# Patient Record
Sex: FEMALE | Race: WHITE | Employment: FULL TIME | URBAN - METROPOLITAN AREA
[De-identification: names, ages, dates, MRNs, and addresses within clinical notes are randomized per-mention and may not be internally consistent; named-entity substitution may affect disease eponyms.]

---

## 2018-11-18 ENCOUNTER — APPOINTMENT (OUTPATIENT)
Dept: CT IMAGING | Facility: CLINIC | Age: 38
DRG: 896 | End: 2018-11-18
Attending: EMERGENCY MEDICINE
Payer: COMMERCIAL

## 2018-11-18 ENCOUNTER — HOSPITAL ENCOUNTER (INPATIENT)
Facility: CLINIC | Age: 38
LOS: 2 days | Discharge: HOME | DRG: 896 | End: 2018-11-20
Attending: EMERGENCY MEDICINE | Admitting: INTERNAL MEDICINE
Payer: COMMERCIAL

## 2018-11-18 DIAGNOSIS — F10.920 ALCOHOL INTOXICATION, UNCOMPLICATED (H): ICD-10-CM

## 2018-11-18 DIAGNOSIS — T42.4X4A BENZODIAZEPINE OVERDOSE OF UNDETERMINED INTENT, INITIAL ENCOUNTER: ICD-10-CM

## 2018-11-18 DIAGNOSIS — F41.9 ANXIETY: ICD-10-CM

## 2018-11-18 DIAGNOSIS — G92.9 TOXIC ENCEPHALOPATHY: Primary | ICD-10-CM

## 2018-11-18 PROBLEM — T50.901A OVERDOSE: Status: ACTIVE | Noted: 2018-11-18

## 2018-11-18 LAB
ALBUMIN SERPL-MCNC: 4 G/DL (ref 3.4–5)
ALBUMIN UR-MCNC: NEGATIVE MG/DL
ALP SERPL-CCNC: 72 U/L (ref 40–150)
ALT SERPL W P-5'-P-CCNC: 15 U/L (ref 0–50)
AMPHETAMINES UR QL SCN: NEGATIVE
ANION GAP SERPL CALCULATED.3IONS-SCNC: 5 MMOL/L (ref 3–14)
APAP SERPL-MCNC: <2 MG/L (ref 10–20)
APPEARANCE UR: CLEAR
AST SERPL W P-5'-P-CCNC: 12 U/L (ref 0–45)
BARBITURATES UR QL: NEGATIVE
BASOPHILS # BLD AUTO: 0 10E9/L (ref 0–0.2)
BASOPHILS NFR BLD AUTO: 0.3 %
BENZODIAZ UR QL: POSITIVE
BILIRUB SERPL-MCNC: 0.4 MG/DL (ref 0.2–1.3)
BILIRUB UR QL STRIP: NEGATIVE
BUN SERPL-MCNC: 6 MG/DL (ref 7–30)
CALCIUM SERPL-MCNC: 8.2 MG/DL (ref 8.5–10.1)
CANNABINOIDS UR QL SCN: NEGATIVE
CHLORIDE SERPL-SCNC: 115 MMOL/L (ref 94–109)
CO2 BLDCOV-SCNC: 24 MMOL/L (ref 21–28)
CO2 SERPL-SCNC: 24 MMOL/L (ref 20–32)
COCAINE UR QL: NEGATIVE
COLOR UR AUTO: NORMAL
CREAT SERPL-MCNC: 0.61 MG/DL (ref 0.52–1.04)
DIFFERENTIAL METHOD BLD: NORMAL
EOSINOPHIL # BLD AUTO: 0.1 10E9/L (ref 0–0.7)
EOSINOPHIL NFR BLD AUTO: 0.5 %
ERYTHROCYTE [DISTWIDTH] IN BLOOD BY AUTOMATED COUNT: 13.9 % (ref 10–15)
ETHANOL SERPL-MCNC: 0.2 G/DL
GFR SERPL CREATININE-BSD FRML MDRD: >90 ML/MIN/1.7M2
GLUCOSE BLDC GLUCOMTR-MCNC: 71 MG/DL (ref 70–99)
GLUCOSE BLDC GLUCOMTR-MCNC: 89 MG/DL (ref 70–99)
GLUCOSE SERPL-MCNC: 89 MG/DL (ref 70–99)
GLUCOSE UR STRIP-MCNC: NEGATIVE MG/DL
HCG SERPL QL: NEGATIVE
HCT VFR BLD AUTO: 38.2 % (ref 35–47)
HGB BLD-MCNC: 12.7 G/DL (ref 11.7–15.7)
HGB UR QL STRIP: NEGATIVE
IMM GRANULOCYTES # BLD: 0 10E9/L (ref 0–0.4)
IMM GRANULOCYTES NFR BLD: 0.1 %
KETONES UR STRIP-MCNC: NEGATIVE MG/DL
LACTATE BLD-SCNC: 1.9 MMOL/L (ref 0.7–2.1)
LACTATE BLD-SCNC: 2 MMOL/L (ref 0.7–2)
LEUKOCYTE ESTERASE UR QL STRIP: NEGATIVE
LYMPHOCYTES # BLD AUTO: 2.8 10E9/L (ref 0.8–5.3)
LYMPHOCYTES NFR BLD AUTO: 30.5 %
MAGNESIUM SERPL-MCNC: 2.4 MG/DL (ref 1.6–2.3)
MCH RBC QN AUTO: 28.8 PG (ref 26.5–33)
MCHC RBC AUTO-ENTMCNC: 33.2 G/DL (ref 31.5–36.5)
MCV RBC AUTO: 87 FL (ref 78–100)
MONOCYTES # BLD AUTO: 0.5 10E9/L (ref 0–1.3)
MONOCYTES NFR BLD AUTO: 5 %
NEUTROPHILS # BLD AUTO: 5.8 10E9/L (ref 1.6–8.3)
NEUTROPHILS NFR BLD AUTO: 63.6 %
NITRATE UR QL: NEGATIVE
OPIATES UR QL SCN: NEGATIVE
PCO2 BLDV: 41 MM HG (ref 40–50)
PCP UR QL SCN: NEGATIVE
PH BLDV: 7.38 PH (ref 7.32–7.43)
PH UR STRIP: 5 PH (ref 5–7)
PLATELET # BLD AUTO: 296 10E9/L (ref 150–450)
PO2 BLDV: 47 MM HG (ref 25–47)
POTASSIUM SERPL-SCNC: 3.4 MMOL/L (ref 3.4–5.3)
PROT SERPL-MCNC: 7.7 G/DL (ref 6.8–8.8)
RBC # BLD AUTO: 4.41 10E12/L (ref 3.8–5.2)
SALICYLATES SERPL-MCNC: <2 MG/DL
SAO2 % BLDV FROM PO2: 81 %
SODIUM SERPL-SCNC: 144 MMOL/L (ref 133–144)
SOURCE: NORMAL
SP GR UR STRIP: 1.01 (ref 1–1.03)
TSH SERPL DL<=0.005 MIU/L-ACNC: 1.49 MU/L (ref 0.4–4)
UROBILINOGEN UR STRIP-MCNC: NORMAL MG/DL (ref 0–2)
WBC # BLD AUTO: 9.2 10E9/L (ref 4–11)

## 2018-11-18 PROCEDURE — 83605 ASSAY OF LACTIC ACID: CPT

## 2018-11-18 PROCEDURE — 25000128 H RX IP 250 OP 636: Performed by: INTERNAL MEDICINE

## 2018-11-18 PROCEDURE — 80329 ANALGESICS NON-OPIOID 1 OR 2: CPT | Performed by: EMERGENCY MEDICINE

## 2018-11-18 PROCEDURE — 83735 ASSAY OF MAGNESIUM: CPT | Performed by: EMERGENCY MEDICINE

## 2018-11-18 PROCEDURE — 40000965 ZZH STATISTIC END TITIAL CO2 MONITORING

## 2018-11-18 PROCEDURE — 00000146 ZZHCL STATISTIC GLUCOSE BY METER IP

## 2018-11-18 PROCEDURE — 84443 ASSAY THYROID STIM HORMONE: CPT | Performed by: EMERGENCY MEDICINE

## 2018-11-18 PROCEDURE — 99291 CRITICAL CARE FIRST HOUR: CPT | Mod: 25

## 2018-11-18 PROCEDURE — 99292 CRITICAL CARE ADDL 30 MIN: CPT

## 2018-11-18 PROCEDURE — 93005 ELECTROCARDIOGRAM TRACING: CPT

## 2018-11-18 PROCEDURE — 82803 BLOOD GASES ANY COMBINATION: CPT

## 2018-11-18 PROCEDURE — 80307 DRUG TEST PRSMV CHEM ANLYZR: CPT | Performed by: EMERGENCY MEDICINE

## 2018-11-18 PROCEDURE — 96360 HYDRATION IV INFUSION INIT: CPT

## 2018-11-18 PROCEDURE — 96361 HYDRATE IV INFUSION ADD-ON: CPT

## 2018-11-18 PROCEDURE — 40000275 ZZH STATISTIC RCP TIME EA 10 MIN

## 2018-11-18 PROCEDURE — 99223 1ST HOSP IP/OBS HIGH 75: CPT | Mod: AI | Performed by: INTERNAL MEDICINE

## 2018-11-18 PROCEDURE — 85025 COMPLETE CBC W/AUTO DIFF WBC: CPT | Performed by: EMERGENCY MEDICINE

## 2018-11-18 PROCEDURE — 84703 CHORIONIC GONADOTROPIN ASSAY: CPT | Performed by: EMERGENCY MEDICINE

## 2018-11-18 PROCEDURE — 25000128 H RX IP 250 OP 636: Performed by: EMERGENCY MEDICINE

## 2018-11-18 PROCEDURE — 83605 ASSAY OF LACTIC ACID: CPT | Performed by: EMERGENCY MEDICINE

## 2018-11-18 PROCEDURE — 80320 DRUG SCREEN QUANTALCOHOLS: CPT | Performed by: EMERGENCY MEDICINE

## 2018-11-18 PROCEDURE — 80053 COMPREHEN METABOLIC PANEL: CPT | Performed by: EMERGENCY MEDICINE

## 2018-11-18 PROCEDURE — 70450 CT HEAD/BRAIN W/O DYE: CPT

## 2018-11-18 PROCEDURE — 81003 URINALYSIS AUTO W/O SCOPE: CPT | Performed by: EMERGENCY MEDICINE

## 2018-11-18 PROCEDURE — 20000003 ZZH R&B ICU

## 2018-11-18 RX ORDER — POTASSIUM CL/LIDO/0.9 % NACL 10MEQ/0.1L
10 INTRAVENOUS SOLUTION, PIGGYBACK (ML) INTRAVENOUS
Status: DISCONTINUED | OUTPATIENT
Start: 2018-11-18 | End: 2018-11-20 | Stop reason: HOSPADM

## 2018-11-18 RX ORDER — ONDANSETRON 2 MG/ML
4 INJECTION INTRAMUSCULAR; INTRAVENOUS EVERY 6 HOURS PRN
Status: DISCONTINUED | OUTPATIENT
Start: 2018-11-18 | End: 2018-11-20 | Stop reason: HOSPADM

## 2018-11-18 RX ORDER — AMOXICILLIN 250 MG
2 CAPSULE ORAL 2 TIMES DAILY PRN
Status: DISCONTINUED | OUTPATIENT
Start: 2018-11-18 | End: 2018-11-20 | Stop reason: HOSPADM

## 2018-11-18 RX ORDER — PROCHLORPERAZINE MALEATE 5 MG
10 TABLET ORAL EVERY 6 HOURS PRN
Status: DISCONTINUED | OUTPATIENT
Start: 2018-11-18 | End: 2018-11-19

## 2018-11-18 RX ORDER — BISACODYL 10 MG
10 SUPPOSITORY, RECTAL RECTAL DAILY PRN
Status: DISCONTINUED | OUTPATIENT
Start: 2018-11-18 | End: 2018-11-20 | Stop reason: HOSPADM

## 2018-11-18 RX ORDER — NALOXONE HYDROCHLORIDE 0.4 MG/ML
.1-.4 INJECTION, SOLUTION INTRAMUSCULAR; INTRAVENOUS; SUBCUTANEOUS
Status: DISCONTINUED | OUTPATIENT
Start: 2018-11-18 | End: 2018-11-20 | Stop reason: HOSPADM

## 2018-11-18 RX ORDER — POTASSIUM CHLORIDE 7.45 MG/ML
10 INJECTION INTRAVENOUS
Status: DISCONTINUED | OUTPATIENT
Start: 2018-11-18 | End: 2018-11-20 | Stop reason: HOSPADM

## 2018-11-18 RX ORDER — PROCHLORPERAZINE 25 MG
25 SUPPOSITORY, RECTAL RECTAL EVERY 12 HOURS PRN
Status: DISCONTINUED | OUTPATIENT
Start: 2018-11-18 | End: 2018-11-19

## 2018-11-18 RX ORDER — POTASSIUM CHLORIDE 1500 MG/1
20-40 TABLET, EXTENDED RELEASE ORAL
Status: DISCONTINUED | OUTPATIENT
Start: 2018-11-18 | End: 2018-11-20 | Stop reason: HOSPADM

## 2018-11-18 RX ORDER — ONDANSETRON 4 MG/1
4 TABLET, ORALLY DISINTEGRATING ORAL EVERY 6 HOURS PRN
Status: DISCONTINUED | OUTPATIENT
Start: 2018-11-18 | End: 2018-11-20 | Stop reason: HOSPADM

## 2018-11-18 RX ORDER — POTASSIUM CHLORIDE 1.5 G/1.58G
20-40 POWDER, FOR SOLUTION ORAL
Status: DISCONTINUED | OUTPATIENT
Start: 2018-11-18 | End: 2018-11-20 | Stop reason: HOSPADM

## 2018-11-18 RX ORDER — POTASSIUM CHLORIDE 29.8 MG/ML
20 INJECTION INTRAVENOUS
Status: DISCONTINUED | OUTPATIENT
Start: 2018-11-18 | End: 2018-11-20 | Stop reason: HOSPADM

## 2018-11-18 RX ORDER — SODIUM CHLORIDE 9 MG/ML
INJECTION, SOLUTION INTRAVENOUS CONTINUOUS
Status: DISCONTINUED | OUTPATIENT
Start: 2018-11-18 | End: 2018-11-20 | Stop reason: HOSPADM

## 2018-11-18 RX ORDER — ACETAMINOPHEN 325 MG/1
650 TABLET ORAL EVERY 4 HOURS PRN
Status: DISCONTINUED | OUTPATIENT
Start: 2018-11-18 | End: 2018-11-20 | Stop reason: HOSPADM

## 2018-11-18 RX ORDER — AMOXICILLIN 250 MG
1 CAPSULE ORAL 2 TIMES DAILY PRN
Status: DISCONTINUED | OUTPATIENT
Start: 2018-11-18 | End: 2018-11-20 | Stop reason: HOSPADM

## 2018-11-18 RX ORDER — ACETAMINOPHEN 650 MG/1
650 SUPPOSITORY RECTAL EVERY 4 HOURS PRN
Status: DISCONTINUED | OUTPATIENT
Start: 2018-11-18 | End: 2018-11-20 | Stop reason: HOSPADM

## 2018-11-18 RX ORDER — POLYETHYLENE GLYCOL 3350 17 G/17G
17 POWDER, FOR SOLUTION ORAL DAILY PRN
Status: DISCONTINUED | OUTPATIENT
Start: 2018-11-18 | End: 2018-11-20 | Stop reason: HOSPADM

## 2018-11-18 RX ADMIN — SODIUM CHLORIDE, POTASSIUM CHLORIDE, SODIUM LACTATE AND CALCIUM CHLORIDE 1000 ML: 600; 310; 30; 20 INJECTION, SOLUTION INTRAVENOUS at 20:01

## 2018-11-18 RX ADMIN — SODIUM CHLORIDE: 9 INJECTION, SOLUTION INTRAVENOUS at 22:04

## 2018-11-18 RX ADMIN — SODIUM CHLORIDE, POTASSIUM CHLORIDE, SODIUM LACTATE AND CALCIUM CHLORIDE 1000 ML: 600; 310; 30; 20 INJECTION, SOLUTION INTRAVENOUS at 18:23

## 2018-11-18 ASSESSMENT — ACTIVITIES OF DAILY LIVING (ADL)
COGNITION: 0 - NO COGNITION ISSUES REPORTED
TRANSFERRING: 0-->INDEPENDENT
TOILETING: 0-->INDEPENDENT
AMBULATION: 0-->INDEPENDENT
FALL_HISTORY_WITHIN_LAST_SIX_MONTHS: NO
RETIRED_COMMUNICATION: 0-->UNDERSTANDS/COMMUNICATES WITHOUT DIFFICULTY
RETIRED_EATING: 0-->INDEPENDENT
SWALLOWING: 0-->SWALLOWS FOODS/LIQUIDS WITHOUT DIFFICULTY
DRESS: 0-->INDEPENDENT
BATHING: 0-->INDEPENDENT

## 2018-11-18 NOTE — IP AVS SNAPSHOT
[facility] [address] [facility]    [address] [place] [address]    Phone:  [phone]                                       After Visit Summary   11/18/2018    Ana Blunt    MRN: [id number]           After Visit Summary Signature Page     I have received my discharge instructions, and my questions have been answered. I have discussed any challenges I see with this plan with the nurse or doctor.    ..........................................................................................................................................  Patient/Patient Representative Signature      ..........................................................................................................................................  Patient Representative Print Name and Relationship to Patient    ..................................................               ................................................  Date                                   Time    ..........................................................................................................................................  Reviewed by Signature/Title    ...................................................              ..............................................  Date                                               Time          22EPIC Rev 08/18

## 2018-11-18 NOTE — IP AVS SNAPSHOT
MRN:[id number]                      After Visit Summary   11/18/2018    Ana Blunt    MRN: [id number]           Thank you!     Thank you for choosing [facility] for your care. Our goal is always to provide you with excellent care. Hearing back from our patients is one way we can continue to improve our services. Please take a few minutes to complete the written survey that you may receive in the mail after you visit with us. Thank you!        Patient Information     Date Of Birth          1980        Designated Caregiver       Most Recent Value    Caregiver    Will someone help with your care after discharge? no      About your hospital stay     You were admitted on:  November 18, 2018 You last received care in the:  [facility] [address] [facility]    You were discharged on:  November 20, 2018        Reason for your hospital stay       Alcohol intoxication, overdose, anxiety                  Who to Call     For medical emergencies, please call 911.  For non-urgent questions about your medical care, please call your primary care provider or clinic, None          Attending Provider     Provider Specialty    Ann Colon MD Emergency Medicine    Russ Newell MD Emergency Medicine    Enrique Medina MD Internal Medicine       Primary Care Provider Fax #    Physician No Ref-Primary [phone]      After Care Instructions     Activity       Your activity upon discharge: activity as tolerated            Diet       Follow this diet upon discharge: Regular, avoid alcohol                  Follow-up Appointments     Follow-up and recommended labs and tests        1. Patient should follow-up with Dr. Phoenix at [facility] ([phone]) on December 3rd at 2:45pm                        Pending Results     No orders found from 11/16/2018 to 11/19/2018.            Statement of Approval     Ordered          11/20/18 1049  I have reviewed and agree with all the  "recommendations and orders detailed in this document.  EFFECTIVE NOW     Approved and electronically signed by:  Shoaib Montano DO             Admission Information     Date & Time Provider Department Dept. Phone    2018 Enrique Medina MD [facility] [address] [facility] [phone]      Your Vitals Were     Blood Pressure Pulse Temperature Respirations Height Weight    121/86 (BP Location: Left arm) 76 97.6  F (36.4  C) (Oral) 18 1.676 m (5' 6\") 67.2 kg (148 lb 2.4 oz)    Last Period Pulse Oximetry BMI (Body Mass Index)             (LMP Unknown) 94% 23.91 kg/m2         [employer] Information     [facility] lets you send messages to your doctor, view your test results, renew your prescriptions, schedule appointments and more. To sign up, go to [email or web address] . Click on \"Log in\" on the left side of the screen, which will take you to the Welcome page. Then click on \"Sign up Now\" on the right side of the page.     You will be asked to enter the access code listed below, as well as some personal information. Please follow the directions to create your username and password.     Your access code is: [email or web address]  Expires: 2019  9:22 AM     Your access code will  in 90 days. If you need help or a new code, please call your [facility] clinic or [phone].        Care EveryWhere ID     This is your Care EveryWhere ID. This could be used by other organizations to access your [facility] medical records  [id number]        Equal Access to Services     [facility]: Hadabimael mcdonald Somonique, waaxda luqadaha, qaybta kaalmabaldemar meier idiin hayaan adeeg kharash la'aan . So [facility] [phone].    ATENCIÓN: Si habla español, tiene a horn disposición servicios gratuitos de asistencia lingüística. Llame al [phone].    We comply with applicable federal civil rights laws and [place] laws. We do not discriminate on the basis of race, color, national origin, age, disability, sex, " sexual orientation, or gender identity.               Review of your medicines      START taking        Dose / Directions    QUEtiapine 25 MG tablet   Commonly known as:  SEROquel   Used for:  Anxiety        Dose:  12.5-25 mg   Take 0.5-1 tablets (12.5-25 mg) by mouth 2 times daily as needed (anxiety)   Quantity:  30 tablet   Refills:  0       vortioxetine 5 MG tablet   Commonly known as:  TRINTELLIX/BRINTELLIX   Used for:  Anxiety        Dose:  5 mg   Start taking on:  11/21/2018   Take 1 tablet (5 mg) by mouth daily   Quantity:  30 tablet   Refills:  0         CONTINUE these medicines which have NOT CHANGED        Dose / Directions    ferrous sulfate 325 (65 Fe) MG tablet   Commonly known as:  IRON        Dose:  325 mg   Take 325 mg by mouth daily   Refills:  0       magnesium oxide 400 MG Caps        Dose:  1 capsule   Take 1 capsule by mouth daily   Refills:  0       multivitamin, therapeutic Tabs tablet        Dose:  1 tablet   Take 1 tablet by mouth daily   Refills:  0         STOP taking     ALPRAZolam 0.5 MG tablet   Commonly known as:  XANAX           citalopram 20 MG tablet   Commonly known as:  celeXA                Where to get your medicines      These medications were sent to [facility] [address] [place] [address] [address] [address]    Hours:  24-hours Phone:  [phone]     QUEtiapine 25 MG tablet    vortioxetine 5 MG tablet                Protect others around you: Learn how to safely use, store and throw away your medicines at www.disposemymeds.org.             Medication List: This is a list of all your medications and when to take them. Check marks below indicate your daily home schedule. Keep this list as a reference.      Medications           Morning Afternoon Evening Bedtime As Needed    ferrous sulfate 325 (65 Fe) MG tablet   Commonly known as:  IRON   Take 325 mg by mouth daily                                   magnesium  oxide 400 MG Caps   Take 1 capsule by mouth daily                                   multivitamin, therapeutic Tabs tablet   Take 1 tablet by mouth daily                                   QUEtiapine 25 MG tablet   Commonly known as:  SEROquel   Take 0.5-1 tablets (12.5-25 mg) by mouth 2 times daily as needed (anxiety)                                   vortioxetine 5 MG tablet   Commonly known as:  TRINTELLIX/BRINTELLIX   Take 1 tablet (5 mg) by mouth daily   Start taking on:  11/21/2018   Last time this was given:  5 mg on 11/20/2018  8:57 AM

## 2018-11-19 LAB
ALBUMIN SERPL-MCNC: 3.3 G/DL (ref 3.4–5)
ALP SERPL-CCNC: 60 U/L (ref 40–150)
ALT SERPL W P-5'-P-CCNC: 14 U/L (ref 0–50)
ANION GAP SERPL CALCULATED.3IONS-SCNC: 3 MMOL/L (ref 3–14)
AST SERPL W P-5'-P-CCNC: 20 U/L (ref 0–45)
BILIRUB DIRECT SERPL-MCNC: <0.1 MG/DL (ref 0–0.2)
BILIRUB SERPL-MCNC: 0.5 MG/DL (ref 0.2–1.3)
BUN SERPL-MCNC: 5 MG/DL (ref 7–30)
CALCIUM SERPL-MCNC: 7.7 MG/DL (ref 8.5–10.1)
CHLORIDE SERPL-SCNC: 116 MMOL/L (ref 94–109)
CO2 SERPL-SCNC: 24 MMOL/L (ref 20–32)
CREAT SERPL-MCNC: 0.52 MG/DL (ref 0.52–1.04)
ERYTHROCYTE [DISTWIDTH] IN BLOOD BY AUTOMATED COUNT: 14.1 % (ref 10–15)
GFR SERPL CREATININE-BSD FRML MDRD: >90 ML/MIN/1.7M2
GLUCOSE BLDC GLUCOMTR-MCNC: 149 MG/DL (ref 70–99)
GLUCOSE BLDC GLUCOMTR-MCNC: 76 MG/DL (ref 70–99)
GLUCOSE BLDC GLUCOMTR-MCNC: 80 MG/DL (ref 70–99)
GLUCOSE BLDC GLUCOMTR-MCNC: 84 MG/DL (ref 70–99)
GLUCOSE SERPL-MCNC: 79 MG/DL (ref 70–99)
HCT VFR BLD AUTO: 34.1 % (ref 35–47)
HGB BLD-MCNC: 11.3 G/DL (ref 11.7–15.7)
INTERPRETATION ECG - MUSE: NORMAL
MAGNESIUM SERPL-MCNC: 2 MG/DL (ref 1.6–2.3)
MCH RBC QN AUTO: 28.8 PG (ref 26.5–33)
MCHC RBC AUTO-ENTMCNC: 33.1 G/DL (ref 31.5–36.5)
MCV RBC AUTO: 87 FL (ref 78–100)
PLATELET # BLD AUTO: 270 10E9/L (ref 150–450)
POTASSIUM SERPL-SCNC: 4 MMOL/L (ref 3.4–5.3)
PROT SERPL-MCNC: 6.5 G/DL (ref 6.8–8.8)
RBC # BLD AUTO: 3.92 10E12/L (ref 3.8–5.2)
SODIUM SERPL-SCNC: 143 MMOL/L (ref 133–144)
WBC # BLD AUTO: 11.3 10E9/L (ref 4–11)

## 2018-11-19 PROCEDURE — 36415 COLL VENOUS BLD VENIPUNCTURE: CPT | Performed by: INTERNAL MEDICINE

## 2018-11-19 PROCEDURE — 85027 COMPLETE CBC AUTOMATED: CPT | Performed by: INTERNAL MEDICINE

## 2018-11-19 PROCEDURE — 25000128 H RX IP 250 OP 636: Performed by: INTERNAL MEDICINE

## 2018-11-19 PROCEDURE — 99221 1ST HOSP IP/OBS SF/LOW 40: CPT | Performed by: PSYCHIATRY & NEUROLOGY

## 2018-11-19 PROCEDURE — 80048 BASIC METABOLIC PNL TOTAL CA: CPT | Performed by: INTERNAL MEDICINE

## 2018-11-19 PROCEDURE — 80076 HEPATIC FUNCTION PANEL: CPT | Performed by: INTERNAL MEDICINE

## 2018-11-19 PROCEDURE — 25000132 ZZH RX MED GY IP 250 OP 250 PS 637: Performed by: INTERNAL MEDICINE

## 2018-11-19 PROCEDURE — 00000146 ZZHCL STATISTIC GLUCOSE BY METER IP

## 2018-11-19 PROCEDURE — 99232 SBSQ HOSP IP/OBS MODERATE 35: CPT | Performed by: INTERNAL MEDICINE

## 2018-11-19 PROCEDURE — 83735 ASSAY OF MAGNESIUM: CPT | Performed by: INTERNAL MEDICINE

## 2018-11-19 PROCEDURE — 12000000 ZZH R&B MED SURG/OB

## 2018-11-19 RX ORDER — CALCIUM CARBONATE/VITAMIN D3 500-10/5ML
1 LIQUID (ML) ORAL DAILY
COMMUNITY

## 2018-11-19 RX ORDER — MULTIPLE VITAMINS W/ MINERALS TAB 9MG-400MCG
1 TAB ORAL DAILY
Status: DISCONTINUED | OUTPATIENT
Start: 2018-11-19 | End: 2018-11-20 | Stop reason: HOSPADM

## 2018-11-19 RX ORDER — FOLIC ACID 1 MG/1
1 TABLET ORAL DAILY
Status: DISCONTINUED | OUTPATIENT
Start: 2018-11-19 | End: 2018-11-20 | Stop reason: HOSPADM

## 2018-11-19 RX ORDER — ALPRAZOLAM 0.5 MG
0.5 TABLET ORAL DAILY PRN
Status: ON HOLD | COMMUNITY
End: 2018-11-20

## 2018-11-19 RX ORDER — MULTIVITAMIN,THERAPEUTIC
1 TABLET ORAL DAILY
COMMUNITY

## 2018-11-19 RX ORDER — LANOLIN ALCOHOL/MO/W.PET/CERES
100 CREAM (GRAM) TOPICAL DAILY
Status: DISCONTINUED | OUTPATIENT
Start: 2018-11-19 | End: 2018-11-20 | Stop reason: HOSPADM

## 2018-11-19 RX ORDER — FERROUS SULFATE 325(65) MG
325 TABLET ORAL DAILY
COMMUNITY

## 2018-11-19 RX ADMIN — MULTIPLE VITAMINS W/ MINERALS TAB 1 TABLET: TAB at 10:52

## 2018-11-19 RX ADMIN — Medication 100 MG: at 10:52

## 2018-11-19 RX ADMIN — FOLIC ACID 1 MG: 1 TABLET ORAL at 10:52

## 2018-11-19 RX ADMIN — SODIUM CHLORIDE: 9 INJECTION, SOLUTION INTRAVENOUS at 13:58

## 2018-11-19 RX ADMIN — SODIUM CHLORIDE: 9 INJECTION, SOLUTION INTRAVENOUS at 06:52

## 2018-11-19 ASSESSMENT — ACTIVITIES OF DAILY LIVING (ADL)
ADLS_ACUITY_SCORE: 10
ADLS_ACUITY_SCORE: 10
ADLS_ACUITY_SCORE: 11
ADLS_ACUITY_SCORE: 10
ADLS_ACUITY_SCORE: 10
ADLS_ACUITY_SCORE: 11

## 2018-11-19 NOTE — ED PROVIDER NOTES
"  History     Chief Complaint:  Altered mental status    The history is provided by the EMS personnel. The history is limited by the condition of the patient.      Ana Blunt is a 38 year old female who presents via EMS with concern for altered mental status. EMS reports that 40 minutes ago they received a call for a patient with altered mental status at home. They arrived on scene to see the patinet, alert, talking, and conversant, willing to come in, and her male partner who was concerned that she had taken a Lexapro, Citalopram, and Xanax, and possibly an olanzapine, as well as drinking \"three beers\", and she was speaking slower and appeared to be altered. EMS notes that the patient walked out to the ambulance after finding her purse, and no drugs were administered or IVs or IOs placed en route. . Her saturation remained above 96 the entire trip, she was tachycardic at 110 beats per minute, and her blood pressure was 137/93. They state that the patient became less responsive as the ambulance pulled up to the emergency department, and so she was brought to the stabilization room. On review of her chart, it is noted that she has overdosed several times in the past, the last being 6 years ago when she overdosed on benadryl and alcohol.    Allergies:  NKDA    Medications:    Celexa  Xanax    Past Medical History:    The patient denies any significant past medical history.    Past Surgical History:    The patient does not have any pertinent past surgical history.    Family History:    No past pertinent family history.    Social History:  The patient denies tobacco or alcohol use.    Review of Systems   Unable to perform ROS: Patient unresponsive       Physical Exam     Patient Vitals for the past 24 hrs:   BP Temp Temp src Pulse Heart Rate Resp SpO2 Height Weight   11/18/18 2045 98/54 96.6  F (35.9  C) - - 87 17 100 % - -   11/18/18 2030 98/53 - - - 93 (!) 35 99 % - -   11/18/18 2015 91/44 - - - 91 18 99 % - - " "  11/18/18 2000 (!) 89/50 96.6  F (35.9  C) - - 93 20 99 % - -   11/18/18 1945 (!) 89/55 96.6  F (35.9  C) - - 90 22 99 % - -   11/18/18 1930 95/53 - - - - - - - -   11/18/18 1915 (!) 87/52 97  F (36.1  C) - - 92 19 100 % - -   11/18/18 1900 90/50 97.3  F (36.3  C) - - 94 16 100 % - -   11/18/18 1845 97/60 97.7  F (36.5  C) - - 95 18 99 % - -   11/18/18 1838 - 97.2  F (36.2  C) Bladder - - - - - -   11/18/18 1830 105/70 - - - 107 17 99 % - -   11/18/18 1815 124/76 - - 107 107 8 96 % 1.676 m (5' 6\") 64 kg (141 lb)         Physical Exam  General: Somnolent, nontoxic. No distress  Head:  Scalp, face, and head appear normal, atraumatic. No wounds or evidence of injury  Eyes:  Pupils are equal, round, and reactive to light. No nystagmus.     Conjunctivae non-injected and sclerae white  ENT:    The external nose is normal    Pinnae are normal    The oropharynx is normal, mucous membranes moist    Uvula is in the midline  Neck:  Normal range of motion    There is no rigidity noted    Trachea is in the midline  CV:  Tachycardic rate, regular rhythm      Normal S1/S2, no S3/S4    No murmur or rub  Resp:  Lungs are clear and equal bilaterally    There is no tachypnea    No increased work of breathing    No rales, wheezing, or rhonchi  GI:  Abdomen is soft, no rigidity or guarding    No distension, or mass    No tenderness or rebound tenderness   MS:  Normal muscular tone. Moves all extremities spontaneously. No evidence of injury or trauma.     Symmetric motor strength    No lower extremity edema  Skin:  No rash or acute skin lesions noted  Neuro: GCS 11 (E3 V4 M4)    CN II - XII intact    Speech slurred    Strength 5/5 and symmetric in bilateral upper and lower extremities.    patellar reflexes 2+ and symmetric, no ankle clonus    No tremor.     No meningismus   Psych:  Unable to assess. Patient presents after possible overdose.     Emergency Department Course   ECG:    Indication: intoxication  Time: 1825  Vent. Rate 102 " bpm. OK interval 132. QRS duration 86. QT/QTc 366/477. P-R-T axis 65 31 36.  Sinus tachycardia. Otherwise normal. Read time: 1835    Imaging:  Radiographic findings were communicated with the Admitting MD who voiced understanding of the findings.    CT Head without contrast:   Normal head CT as per radiology.    Laboratory:    CBC: WBC: 9.2, HGB: 12.7, PLT: 296  CMP: , Chloride: 115 (H), BUN: 6 (H), Calcium: 8.2 (L), o/w WNL (Creatinine: 0.61)    Lactic acid: 2.0  TSH with free T4: 1.49  Magnesium: 2.4 (H)  Glucose by meter: 89    ISTAT gases lactate Venous: all WNL    Acetaminophen: <2  Salicylate: <2  Drug abuse screen: Benzodiazepines: positive (A)  Alcohol blood: 0.2 (H)    HCG qualitative: Negative    UA with Microscopic: All WNL, o/w WNL    Interventions:    1823 Lactated ringers 1L IV    Emergency Department Course:    (1817) Patient arrives in ED.    (1815) IV inserted    (1621) respiratory on scene    (1623) Oxygen, nasal placed    IV inserted. Medicine administered as documented above. Blood drawn. This was sent to the lab for further testing, results above.    (1905) Recheck    The patient was sent for a head CT while in the emergency department, findings above.     (2003) I rechecked the patient and discussed the results of her workup thus far.     (2007)  I consulted with Dr. Higgins of the hospitalist services. They are in agreement to accept the patient for admission.    (2036) Hospitalist arrives in ER.    (2041) Consulted with hospitalist and recheck.    Discussed the patient with Dr. Higgins, who will admit the patient to a ICU bed for further monitoring, evaluation, and treatment.    Impression & Plan      CMS Diagnoses: The Lactic acid level is elevated due to alcohol abuse, at this time there is no sign of severe sepsis or septic shock.       Medical Decision Making:    Ana Blunt is a 38 year old female who presents for evaluation of altered mental status. EMS states that en route,  patient became unresponsive. They report she may have overdosed on xanax, and celexa but it is unclear how much she may have taken or if she has taken other medications as well. She also is reported to have been drinking alcohol. Intent is undetermined at this point but must assume there was a component of self harm.  On my evaluation patient is obtunded but will respond to tactile stimuli and open her eyes and speak a few words before becoming somnolent. She is unable to provide any useful history.  A broad workup was considered including sequelae of drug overdose (respiratory failure, seizure, arrhythmia, liver or kidney injury, hypotension, metabolic derangement, hypertension, serotonin syndrome, NMS, etc), intentional vs unintentional overdose, volatile alcohol ingestion, ethanol ingestion, encephalitis, meningitis, tylenol or salicylate ingestion, etc. Is Sheining an airway and vitals are acceptable at this point. She is warm and well perfused. CT head obtained and unremarkable. Acetaminophen and salicylates negative. ED workup otherwise unremarkable. Patient was monitored on end tidal capnography with good respiratory rate. She was maintaining her airway. 2L IV crystalloid given in the ED. Her blood pressures were mildly hypotensive however her MAP remained >65. This is consistent with benzodiazepine and alcohol overdose. Would not acutely reverse with flumazenil due to the risk of seizures/acute withdrawal in this patient who likely chronically takes benzodiazepines. No evidence of trauma on head to toe evaluation. The patient was discussed with the hospitalist and admitted to the ICU for further monitoring, evaluation, and treatment.    Critical Care Time for this patient, exclusive of procedures, is 35 minutes due to acute toxic encephalopathy, and drug overdose which required close monitoring of airway, hemodynamics, IVF resuscitation.       Diagnosis:    ICD-10-CM    1. Toxic encephalopathy G92    2.  Benzodiazepine overdose of undetermined intent, initial encounter T42.4X4A    3. Alcohol intoxication, uncomplicated (H) F10.920        Disposition:  Admitted to ICU bed under care of Dr. Gisela Berger Disclosure:  I, Luis Carlson, am serving as a scribe on 11/18/2018 at 6:15 PM to personally document services performed by No att. providers found based on my observations and the provider's statements to me.       Luis Carlson  11/18/2018    EMERGENCY DEPARTMENT       Russ Newell MD  11/18/18 4394

## 2018-11-19 NOTE — PROGRESS NOTES
ICU Multi-Disciplinary Note  Patient condition reviewed and discussed while on multidisciplinary rounds today. Ms. Blunt is a 38 year old female who was admitted with acute alcohol intoxication and altered mental status. She was admitted to the ICU for close monitoring of her respiratory status. She is currently hemodynamically stable without the support of vasoactives and oxygenating adequately on room air. Her mental status is no longer altered; she was evaluated by psychiatry this morning.  The Critical Care service will continue to follow peripherally while the patient is within the ICU. We are readily available should issues arise. Please feel free to contact us for critical care issues with which we may be of assistance. For all other concerns, please contact primary service first.   Lara Rawls, CNP

## 2018-11-19 NOTE — CONSULTS
"Consult Date:  11/19/2018      REQUESTING PHYSICIAN:  Enrique Medina MD.       REASON FOR CONSULTATION:  Overdose with history of anxiety and depression.      IDENTIFYING DATA:  The patient is a 38-year-old   female brought in with altered mental status and acute alcohol intoxication, now convalescing in the ICU.      CHIEF COMPLAINT:  \"I guess alcohol just doesn't agree with me.\"  The patient is a 38-year-old   female who lives with her significant other.  She had been drinking beer and apparently started slurring her speech, became altered and was directed to the emergency room when her significant other became concerned.  She became progressively lethargic.  It looks like she may have taken some Celexa, blood alcohol level was 0.2 on admission, aspirin levels and salicylate levels were negative.  Urine drug screen shows benzodiazepines, she does take a small dose of p.r.n. Xanax.  She indicated to me this morning that she has a history of postpartum depression, she has a history of being hospitalized back in 2011 under the care of Dr. Armenta at [facility] after a minor overdose.  She has a history of alcohol problems and went through treatment in 2009, has 1 DWI.  She tends to minimize her current use of alcohol, stating that it is not frequent.  She denies that this was an intentional ingestion, denies an intent to harm herself.  She continues to be somewhat lethargic, slow to respond to questions, was able to sit up and talk with me, but her recollection of events seems cloudy.  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  She indicates that her significant other has a drinking problem and has been in and out of treatment multiple times, which is an added stress, along with her finances.      On further questioning, she denies a history of hypomania, leighann, panic " "disorder, obsessive-compulsive disorder, eating disorder history or ADHD.  She alludes to some trauma history and has taken Zoloft in the past after a severe postpartum depression with her first child.  She went off that medicine for a time and eventually got on Celexa about a year ago and takes a small dose of Xanax.  She feels the Celexa has not been working well, but had a hard time answering my questions regarding specific depressive symptoms.  She fell back asleep quickly once I stepped out of the room.  She is currently convalescing in the ICU, seems to be showing generally improvement.  She is cooperative at this time and not demanding to leave the hospital.      PAST PSYCHIATRIC HISTORY:  Per chart review, it looks like she has a history of being hospitalized back in 2011 with notable postpartum depression, past treatment with Zoloft.  She has had some issues with anxiety and issues with being \"stalked at work.\"  She indicates recent stress.  The notes from 2011 indicate some emotional detachment, poor sleep and racing thoughts as part of her clinical presentation, but they diagnosed her with acute alcohol intoxication, major depressive disorder, alcohol dependence and an adjustment reaction with unspecified anxiety disorder.      PAST CHEMICAL DEPENDENCY HISTORY:  Notable for suspected alcohol abuse.  Previous treatment noted many years ago with 1 DWI.      PAST MEDICAL HISTORY:  Includes sequela of this drug overdose, chronic neck pain, iron deficiency anemia.      PRIOR TO ADMISSION MEDICATIONS:  Listed as Celexa 20 mg daily, Xanax unknown dose.      FAMILY HISTORY:  She has a maternal grandmother who had psychotic illness and committed suicide.  Her father was alcoholic and her mother had undisclosed mental health issues.      SOCIAL HISTORY:  The patient is .  She has 3 children from that previous marriage, lives with her significant other who apparently has alcohol issues.  She works as a " ", previously in a dental office and alludes to some \"harassment and stalking\" by past coworkers.  She has 3 siblings and grew up in the area.      REVIEW OF SYSTEMS:  A 10-point review of systems is unchanged from Dr. Medina's H and P 11/18/2018 at 9:13 p.m.      MOST RECENT VITAL SIGNS:  Temperature 100, pulse 107, respiratory rate 16, blood pressure 105/63, oxygen saturation 99%.      MENTAL STATUS EXAMINATION:  Appearance:  The patient is a slightly somnolent woman.  She rouses to voice.  She is a marginal historian.  Speech is slow and slightly dysarthric, use of language fair.  Motor exam is slightly lethargic, slowed.  Affect is flat.  Mood \"okay.\"  Thought process seems sluggish.  She is slow to respond to questions, has difficulty collecting her thoughts and seems somewhat altered.  She denies hallucinations, delusions, paranoia, suicidal or homicidal ideation in terms of thought content.  Insight and judgment moderately impaired.     COGNITIVE EXAM:  The patient is slightly somnolent, rouses to voice, concentration poor, recent memory poor, remote memory grossly intact.  General fund of knowledge delayed.      IMPRESSION:  The patient is a 38-year-old woman presenting with acute alcohol intoxication and a probable overdose which may include Celexa and Xanax.  She is not admitting to a suicide attempt, but was clearly under the influence and altered when she got to the hospital.  She continues to show some signs of lethargy.  Psychiatry will follow up tomorrow.  She might benefit from psychiatry transfer, but I think it depends on the overall presentation tomorrow.  Additional psychiatric followup will be warranted after discharge as she indicates she does not currently have a therapist or a psychiatric provider, though had previously seen through [facility].      DIAGNOSES:   1.  Delirium secondary to polydrug ingestion, alcohol intoxication.   2.  Alcohol use disorder by " history.   3.  Major depressive disorder, recurrent, severe by history with postpartum onset.      PLAN:   1.  Medical management.   2.  Psychiatry will follow up tomorrow.  I anticipate she should be observed another 24 hours on the medical service and can probably move up to the medical floor depending on how this goes.   3.  Hold psychotropic meds at this point.         PILO DARNELL MD             D: 2018   T: 2018   MT: CANADCE      Name:     BRANDI EAST   MRN:      [id number]        Account:       [id number]   :      1980           Consult Date:  2018      Document: [id number]       cc: Enrique Medina MD

## 2018-11-19 NOTE — PLAN OF CARE
Problem: Patient Care Overview  Goal: Plan of Care/Patient Progress Review  Outcome: No Change  A&Ox4; calm cooperative. VSS on RA. Up with SBA. Regular diet, good appetite. IVF infusing. Neuros WDL. Denies SI; sit discontinued, VPM in place. Carmichael removed in ICU this am, successful post removal void. LS clear. Denies pain. Poison control has signed off. Psych following. Possible discharge tomorrow.

## 2018-11-19 NOTE — ED NOTES
"[facility]  ED Nurse Handoff Report    ED Chief complaint: Altered Mental Status (Came from home, walked out to ambulance. Potentially took \"Lexapro, Xanax and 3 beers\" Became less reponsive while in ambulance )      ED Diagnosis:   Final diagnoses:   None       Code Status: Full Code    Allergies: No Known Allergies    Activity level - Baseline/Home:  Independent    Activity Level - Current:   Total Care     Needed?: No    Isolation: No  Infection: Not Applicable  Bariatric?: No    Vital Signs:   Vitals:    11/18/18 1815 11/18/18 1830 11/18/18 1838   BP: 124/76 105/70    Pulse: 107     Resp: 8 17    Temp:   97.2  F (36.2  C)   TempSrc:   Bladder   SpO2: 96% 99%    Weight: 64 kg (141 lb)     Height: 1.676 m (5' 6\")         Cardiac Rhythm: ,   Cardiac  Cardiac Rhythm: Normal sinus rhythm    Pain level:      Is this patient confused?: Obtunded   [place] - Suicide Severity Rating Scale Completed?  No, secondary to Paitent unable due to mental status   If yes, what color did the patient score?  N/A    Patient Report: Initial Complaint: Patient came in from EMS where she was found to be arguing with a man at her home. Patent told EMS she had \"drank 5 beers and took 1 xanax and 1 citalopram\" Patient then became obtunded en route, only responding to sternal rub.   Focused Assessment:   Neuro: Obtunded. Opens eyes to painful stimuli   Cards: SR/ST   Pulm: 2L NC, lungs clear to ascultation, EtCO2 monitoring 25-35  GI: NPO in ER   : Urethral Catheter in place   Skin: WDL   Tests Performed: EKG, labs, UA  Abnormal Results: See results review   Treatments provided: Fluids     Family Comments: None present     OBS brochure/video discussed/provided to patient/family: N/A              Name of person given brochure if not patient:               Relationship to patient:     ED Medications:   Medications   lactated ringers BOLUS 1,000 mL (1,000 mLs Intravenous New Bag 11/18/18 1823)       Drips " infusing?:  No    For the majority of the shift this patient was Green.   Interventions performed were none.    Severe Sepsis OR Septic Shock Diagnosis Present: No    To be done/followed up on inpatient unit:      ED NURSE PHONE NUMBER: *[id number] [id number]

## 2018-11-19 NOTE — H&P
[facility]    History and Physical  Hospitalist       Date of Admission:  11/18/2018    Assessment & Plan   Ana Blunt is a 38 year-old female with history of anxiety and depression, past history of overdose who presents with suspected overdose, including benzodiazepines and possible SSRI and alcohol intoxication. Admitted 11/18/2018.     Suspected overdose, including benzodiazepines and possible SSRI  Alcohol intoxication   History is very limited, but the available information indicates EMS was called to home with the patient and a male partner.  Her partner reported that he was concerned that she had taken escitalopram, citalopram, alprazolam and possibly olanzapine as well as had been drinking beer, and subsequently was speaking slower and appeared to become more altered. Amount of ingestions is not known.  She was able to walk out the ambulance but became increasingly more sedated and arrived obtunded.  She is presently able to arouse with sternal rub but is not answering questions.  Acetaminophen and salicylate levels negative.  Ethanol level 0.20.  Urine drug screen positive for benzodiazepines.  No acidosis on VBG or BMP.  .  No particular toxidrome on exam.  - Called and discussed with poison control, supportive measures and close cardiac monitoring recommended.   - Admit to ICU for close respiratory, blood pressure and cardiac monitoring   - Monitor for serotonin syndrome  - Acquire more information from patient when able  - Telemetry  - IV fluids   - Psychiatry consulted    Major depression   - Psychiatry consulted as above    DVT Prophylaxis: Pneumatic Compression Devices  Code Status: Full Code    Disposition: Admit to inpatient. Anticipate greater than or equal to 2 midnights prior to discharge.    Enrique Medina    Primary Care Physician   No primary care provider on file.    Chief Complaint   Overdose    Unable to obtain a history from the patient due to sedation.  History obtained from discussion with Emergency Department provider    History of Present Illness   Ana Blunt is a 38 year old female who presents with the above chief complaint.    By report EMS was called to a home due to a report of a person with altered mental status.  The patient was in the home with a male partner.  The male partner reported concerned that the patient had taken escitalopram, citalopram, alprazolam and possibly olanzapine and had been drinking alcohol.  She had become more altered and was speaking more slowly.  By nursing report, the male partner was argumentative with EMS.  The patient was able to walk out to the ambulance however in route she became more sedated and arrived obtunded.    In the Emergency Department, the patient was seen by Dr. Russ Newell, with whom I discussed the patient's presenting symptoms and emergency department course.  Initial vital signs were a temperature of 97.2F, , /76, RR 8, SpO2 96% on room air. Work-up included unremarkable CBC, VBG, Ethanol level of 0.20, negative ASA and APAP levels, normal UA, UDS positive for benzodiazapines. Her blood pressure was soft and she received IV fluids. Hospitalists were contacted for admission to the hospital.     Past Medical History  - Per CareEverywhere  I have reviewed this patient's medical history and updated it with pertinent information if needed.   Past Medical History:   Diagnosis Date     Anxiety      Chronic neck pain      Iron deficiency anemia      Major depression        Past Surgical History   - Per CareEverywhere  I have reviewed this patient's surgical history and updated it with pertinent information if needed.  Past Surgical History:   Procedure Laterality Date     HYSTERECTOMY         Prior to Admission Medications   Prior to Admission Medications   Prescriptions Last Dose Informant Patient Reported? Taking?   citalopram (CELEXA) 20 MG tablet   No No   Sig: Take 1 tablet by mouth At Bedtime.       Facility-Administered Medications: None     Allergies   - Per CareEverywhere  No Known Allergies    Social History  - Per CareEverywhere  Non smoker. Occasional alcohol use.    Unable to review further due to level of sedation.     Family History  - Per CareEverywhere  Half-brother with depression. Mother had anxiety and OCD. Father had alcohol dependence.    Review of Systems   Unable to obtain reliable review of systems due to level of sedation.     Physical Exam   Temp: 96.6  F (35.9  C) Temp src: Bladder BP: (!) 89/50 Pulse: 107 Heart Rate: 93 Resp: 20 SpO2: 99 % O2 Device: None (Room air)    Vital Signs with Ranges  Temp:  [96.6  F (35.9  C)-97.7  F (36.5  C)] 96.6  F (35.9  C)  Pulse:  [107] 107  Heart Rate:  [] 93  Resp:  [8-22] 20  BP: ()/(50-76) 89/50  SpO2:  [96 %-100 %] 99 %  141 lbs 0 oz    Constitutional: NAD  Eyes: PERRL, EOMI  HENT: Does not open mouth on command and unable to open manually   Respiratory: Clear to auscultation bilaterally, good air movement, normal effort   Cardiovascular: RRR, no m/r/g. No peripheral edema.  GI: Soft, non-tender, non-distended. No rebound tenderness or guarding. BS normoactive.   Skin: Warm, dry  Neurologic: Sedated. Opens eyes to vigorous sternal rub, able to keep eyes open, but does not respond to questions. Moving extremities spontaneously when eyes open  Psychiatric: Unable to assess      Data   Data reviewed today:  I personally reviewed the EKG tracing showing sinus tachcyarda, , QRS 86, QTc 477 ms.    Recent Labs  Lab 11/18/18  1818   WBC 9.2   HGB 12.7   MCV 87         POTASSIUM 3.4   CHLORIDE 115*   CO2 24   BUN 6*   CR 0.61   ANIONGAP 5   CRISTI 8.2*   GLC 89   ALBUMIN 4.0   PROTTOTAL 7.7   BILITOTAL 0.4   ALKPHOS 72   ALT 15   AST 12       Recent Results (from the past 24 hour(s))   CT Head w/o Contrast    Narrative    CT OF THE HEAD WITHOUT CONTRAST 11/18/2018 7:31 PM     COMPARISON: None.    HISTORY: Altered mental  status.     TECHNIQUE: Axial CT images of the head from the skull base to the  vertex were acquired without IV contrast.    FINDINGS: The ventricles and basal cisterns are within normal limits  in configuration. There is no midline shift. There are no extra-axial  fluid collections.  Gray-white differentiation is well maintained.    No intracranial hemorrhage, mass or recent infarct.    The visualized paranasal sinuses are well-aerated. There is no  mastoiditis. There are no fractures of the visualized bones.      Impression    IMPRESSION:  Normal head CT.      Radiation dose for this scan was reduced using automated exposure  control, adjustment of the mA and/or kV according to patient size, or  iterative reconstruction technique

## 2018-11-19 NOTE — ED NOTES
Hospitalist at bedside.  Pt opened her eyes after several sternal rubs.  Still not answering questions.

## 2018-11-19 NOTE — PLAN OF CARE
Problem: Patient Care Overview  Goal: Plan of Care/Patient Progress Review  Outcome: Improving  Pt A/Ox4, much improved from last night. Calm, cooperative and pleasant. No seizure activity noted. Tele SR, QT normal range. BP's WDL. IV fluids infusing. NPO. Carmichael with adequate output. Not out of bed this shift. Pt actively participated in hygiene cares and was able to independently dial the phone to speak to her significant other Ganesh. Pt would like to be able to see her parents today. Psychiatry to see today.

## 2018-11-19 NOTE — PHARMACY-ADMISSION MEDICATION HISTORY
Admission medication history interview status for the 11/18/2018  admission is complete. See EPIC admission navigator for prior to admission medications     Medication history source reliability:Good    Actions taken by pharmacist (provider contacted, etc): Spoke with patient and verified medications with [facility] pharmacy     Additional medication history information not noted on PTA med list :None    Medication reconciliation/reorder completed by provider prior to medication history? Yes    Time spent in this activity: 20 mins    Prior to Admission medications    Medication Sig Last Dose Taking? Auth Provider   ALPRAZolam (XANAX) 0.5 MG tablet Take 0.5 mg by mouth daily as needed for anxiety Unknown at Unknown time Yes Unknown, Entered By History   citalopram (CELEXA) 20 MG tablet Take 1 tablet by mouth At Bedtime. 11/17/2018 at hs Yes Faiza Armenta DO   ferrous sulfate (IRON) 325 (65 Fe) MG tablet Take 325 mg by mouth daily 11/17/2018 at am Yes Unknown, Entered By History   magnesium oxide 400 MG CAPS Take 1 capsule by mouth daily 11/17/2018 at hs Yes Unknown, Entered By History   multivitamin, therapeutic (THERA-VIT) TABS tablet Take 1 tablet by mouth daily 11/17/2018 at am Yes Unknown, Entered By History

## 2018-11-19 NOTE — PROGRESS NOTES
[facility]  Hospitalist Progress Note  Name: Ana Blunt    MRN: [id number]  Physician:  Shoaib Montano DO [facility] ([facility])    Assessment & Plan   Summary of Stay: Ana Blunt is a 38 year old female who was admitted on 11/18/2018.  She presented with altered mentation/somnolence intoxicated and after taking an unclear amount of her home medications.  She since has been waking up.    Alcohol intoxication with polydrug ingestion of celexa and xanax:  -  Added vitamins  -  No sign of withdrawal and patient reports only occasionally binge drinks.  Hold on utilizing withdrawal protocol  -  Hold PTA celexa as psychiatry recommended  -  Appreciate psychiatry consult.  I spoke with Dr. Chapin.  They will reassess the patient tomorrow when she is further cleared from the medications.  Dr. Chapin did not feel she needed suicide precautions right now.  The patient is willing to currently stay.  If she suddenly insists on leaving against advice she would need reassessment on whether that was safe or a hold was needed.    Borderline fever:  -  Patient with temp around 100 this AM.  No acute infectious complaints.  Monitor for now.    Prior history of alcohol use disorder with treatment several years ago    Prior history of overdose/self harm attempt several years ago      DVT Prophylaxis: Pneumatic Compression Devices  Code Status: Full Code    Disposition Plan   Unclear discharge timing, possible discharge tomorrow but may need to go to inpatient psychiatry.     Entered: Shoaib Montano 11/19/2018, 10:08 AM       Interval History   Assumed care, history reviewed.  Ms. Blunt is more awake but still somewhat confused about yesterday and laughs at many questions.  She cannot articulate a consistent story of why she took her extra meds yesterday other than talking about alcohol and recent stress.  She denies current self/others harm intent.  She felt hungry and wanted to advance diet  today.    -Data reviewed today: I reviewed all new labs and imaging reports over the last 24 hours. I personally reviewed no images or EKG's today.    Physical Exam   Temp: 100  F (37.8  C) Temp src: Oral BP: 119/77 Pulse: 107 Heart Rate: 89 Resp: 12 SpO2: 97 % O2 Device: None (Room air) Oxygen Delivery: 3 LPM  Vitals:    11/18/18 1815 11/19/18 0000   Weight: 64 kg (141 lb) 64.1 kg (141 lb 5 oz)     Vital Signs with Ranges  Temp:  [96.6  F (35.9  C)-100  F (37.8  C)] 100  F (37.8  C)  Pulse:  [107] 107  Heart Rate:  [] 89  Resp:  [8-35] 12  BP: ()/(44-82) 119/77  SpO2:  [96 %-100 %] 97 %  I/O last 3 completed shifts:  In: 793.33 [I.V.:793.33]  Out: 1325 [Urine:1325]    GEN:  Alert, oriented x 3 but confused with some details and inappropriate at times emotions with answering questions, appears comfortable, NAD.  HEENT:  Normocephalic/atraumatic, no scleral icterus, no nasal discharge, mouth moist.  CV:  Regular rate and rhythm, no murmur or JVD.  S1 + S2 noted, no S3 or S4.  LUNGS:  Clear to auscultation bilaterally without rales/rhonchi/wheezing/retractions.  Symmetric chest rise on inhalation noted.  ABD:  Active bowel sounds, soft, non-tender/non-distended.  No rebound/guarding/rigidity.  EXT:  No edema.  No cyanosis.  No acute joint synovitis noted.  SKIN:  Dry to touch, no exanthems noted in the visualized areas.  NEURO:  Follows basic commands, gross symmetric strength and sensation to touch.    Medications     sodium chloride 100 mL/hr at 11/19/18 0652       folic acid  1 mg Oral Daily     multivitamin, therapeutic with minerals  1 tablet Oral Daily     vitamin  B-1  100 mg Oral Daily     Data       Recent Labs  Lab 11/19/18  0450 11/18/18  1818   WBC 11.3* 9.2   HGB 11.3* 12.7   HCT 34.1* 38.2   MCV 87 87    296       Recent Labs  Lab 11/19/18  0450 11/18/18  1818    144   POTASSIUM 4.0 3.4   CHLORIDE 116* 115*   CO2 24 24   ANIONGAP 3 5   GLC 79 89   BUN 5* 6*   CR 0.52 0.61    GFRESTIMATED >90 >90   GFRESTBLACK >90 >90   CRISTI 7.7* 8.2*   MAG 2.0 2.4*   PROTTOTAL 6.5* 7.7   ALBUMIN 3.3* 4.0   BILITOTAL 0.5 0.4   ALKPHOS 60 72   AST 20 12   ALT 14 15     HCG Negative    Recent Results (from the past 24 hour(s))   CT Head w/o Contrast    Narrative    CT OF THE HEAD WITHOUT CONTRAST 11/18/2018 7:31 PM     COMPARISON: None.    HISTORY: Altered mental status.     TECHNIQUE: Axial CT images of the head from the skull base to the  vertex were acquired without IV contrast.    FINDINGS: The ventricles and basal cisterns are within normal limits  in configuration. There is no midline shift. There are no extra-axial  fluid collections.  Gray-white differentiation is well maintained.    No intracranial hemorrhage, mass or recent infarct.    The visualized paranasal sinuses are well-aerated. There is no  mastoiditis. There are no fractures of the visualized bones.      Impression    IMPRESSION:  Normal head CT.      Radiation dose for this scan was reduced using automated exposure  control, adjustment of the mA and/or kV according to patient size, or  iterative reconstruction technique    ALEXANDRIA PEÑALOZA MD

## 2018-11-19 NOTE — PROGRESS NOTES
[place] - Suicide Severity Rating Scale Completed? NO  If yes, what color did the patient score? PT UNABLE TO ANSWER QUESTIONS DUE TO SOMNOLENCE

## 2018-11-19 NOTE — PROGRESS NOTES
Poison Control called to check pt's status. This RN reported stable vital signs, pt is able to arouse to voice and has slurred speech. Poison Control advised to continue to monitor for QT prolongation and seizure.     11/18/2018 @9997

## 2018-11-20 VITALS
SYSTOLIC BLOOD PRESSURE: 121 MMHG | RESPIRATION RATE: 18 BRPM | DIASTOLIC BLOOD PRESSURE: 86 MMHG | BODY MASS INDEX: 23.81 KG/M2 | TEMPERATURE: 97.6 F | HEART RATE: 76 BPM | OXYGEN SATURATION: 94 % | WEIGHT: 148.15 LBS | HEIGHT: 66 IN

## 2018-11-20 LAB
ANION GAP SERPL CALCULATED.3IONS-SCNC: 8 MMOL/L (ref 3–14)
BUN SERPL-MCNC: 5 MG/DL (ref 7–30)
CALCIUM SERPL-MCNC: 8.9 MG/DL (ref 8.5–10.1)
CHLORIDE SERPL-SCNC: 109 MMOL/L (ref 94–109)
CO2 SERPL-SCNC: 23 MMOL/L (ref 20–32)
CREAT SERPL-MCNC: 0.51 MG/DL (ref 0.52–1.04)
GFR SERPL CREATININE-BSD FRML MDRD: >90 ML/MIN/1.7M2
GLUCOSE SERPL-MCNC: 91 MG/DL (ref 70–99)
POTASSIUM SERPL-SCNC: 3.7 MMOL/L (ref 3.4–5.3)
SODIUM SERPL-SCNC: 140 MMOL/L (ref 133–144)

## 2018-11-20 PROCEDURE — 80048 BASIC METABOLIC PNL TOTAL CA: CPT | Performed by: INTERNAL MEDICINE

## 2018-11-20 PROCEDURE — 25000132 ZZH RX MED GY IP 250 OP 250 PS 637: Performed by: INTERNAL MEDICINE

## 2018-11-20 PROCEDURE — 25000132 ZZH RX MED GY IP 250 OP 250 PS 637: Performed by: PSYCHIATRY & NEUROLOGY

## 2018-11-20 PROCEDURE — 99207 ZZC CDG-CHARGE REQUIRED MANUAL ENTRY: CPT | Performed by: INTERNAL MEDICINE

## 2018-11-20 PROCEDURE — 99232 SBSQ HOSP IP/OBS MODERATE 35: CPT | Performed by: PSYCHIATRY & NEUROLOGY

## 2018-11-20 PROCEDURE — 99239 HOSP IP/OBS DSCHRG MGMT >30: CPT | Performed by: INTERNAL MEDICINE

## 2018-11-20 PROCEDURE — 36415 COLL VENOUS BLD VENIPUNCTURE: CPT | Performed by: INTERNAL MEDICINE

## 2018-11-20 RX ORDER — QUETIAPINE FUMARATE 25 MG/1
12.5-25 TABLET, FILM COATED ORAL 2 TIMES DAILY PRN
Qty: 30 TABLET | Refills: 0 | Status: SHIPPED | OUTPATIENT
Start: 2018-11-20

## 2018-11-20 RX ADMIN — FOLIC ACID 1 MG: 1 TABLET ORAL at 08:57

## 2018-11-20 RX ADMIN — VORTIOXETINE 5 MG: 5 TABLET, FILM COATED ORAL at 08:57

## 2018-11-20 RX ADMIN — Medication 100 MG: at 08:57

## 2018-11-20 RX ADMIN — MULTIPLE VITAMINS W/ MINERALS TAB 1 TABLET: TAB at 08:56

## 2018-11-20 ASSESSMENT — ACTIVITIES OF DAILY LIVING (ADL)
ADLS_ACUITY_SCORE: 10

## 2018-11-20 NOTE — PROGRESS NOTES
Video Patient Monitoring (VPM) Downtime Documentation      Reason for VPM:  Safety Issue: Impulsive, Manic behaviors, Altered mental status- potential flight risk      Behavior:   Does not exhibit behaviors and actions with potential for harm- cooperative      Assessment:  Does not exhibit behaviors and actions with potential for harm       Plan:   No 1:1 staff required in patient room; implement bed/chair alarm if not already in place

## 2018-11-20 NOTE — PLAN OF CARE
Problem: Patient Care Overview  Goal: Plan of Care/Patient Progress Review  Outcome: No Change  Pt A&Ox4. VSS on RA. Denied pain. Up with SBA. Tolerating Regular diet. LS clerar. Neuros intact. Tele NSR. Voiding adequately. Psych following. Continue to monitor.,

## 2018-11-20 NOTE — PROGRESS NOTES
[facility] Psychiatric Consult Follow-up Note      TIME SPENT IN PSYCHIATRY CONSULT: 15 MINUTES.     Interim History   The patient's care was discussed, patient seen and chart notes were reviewed. Pt examined on 11/20/18 by Dr. Phoenix. Upon interview, patient first explains to Dr. Phoenix why she was admitted into the hospital in the first place. Admitting that she was stressed and started to drink due to a conflict that took place with her fiance. She became tearful when discussing this. She states she stopped going to DBT in June, she had been attending this program for two years. She does not currently have a therapist. She claims that she stopped going to DBT and hasn't seen a therapist because she does not have enough time to do so, with schooling and having three children. She declares that she is an anxious person, a worrier rather than depressed. Patient endorses consistent, pervasive sense of anxiety and worry. Patient finds this anxiety difficult to control, feeling on edge, and irritability. Dr. Phoenix reviews patients past and current medications. Patient states that her medications have never worked well enough for her. Dr. Phoenix informs patient about the medication Trintellix and the benefits and side effects of this medication. Patient acknowledges and agrees to try this medication. From this, Dr. Phoenix will start patient on Trintellix 5mg. Additionally, to aid in patients anxiety throughout the day, Dr. Phoenix will start patient on Seroquel 12.5-25mg BID PRN.      Medications     Current Facility-Administered Medications Ordered in Epic   Medication Dose Route Frequency Last Rate Last Dose     acetaminophen (TYLENOL) Suppository 650 mg  650 mg Rectal Q4H PRN         acetaminophen (TYLENOL) tablet 650 mg  650 mg Oral Q4H PRN         bisacodyl (DULCOLAX) Suppository 10 mg  10 mg Rectal Daily PRN         folic acid (FOLVITE) tablet 1 mg  1 mg Oral Daily   1 mg at  "11/19/18 1052     melatonin tablet 1 mg  1 mg Oral At Bedtime PRN         multivitamin, therapeutic with minerals (THERA-VIT-M) tablet 1 tablet  1 tablet Oral Daily   1 tablet at 11/19/18 1052     naloxone (NARCAN) injection 0.1-0.4 mg  0.1-0.4 mg Intravenous Q2 Min PRN         ondansetron (ZOFRAN-ODT) ODT tab 4 mg  4 mg Oral Q6H PRN        Or     ondansetron (ZOFRAN) injection 4 mg  4 mg Intravenous Q6H PRN         polyethylene glycol (MIRALAX/GLYCOLAX) Packet 17 g  17 g Oral Daily PRN         potassium chloride (KLOR-CON) Packet 20-40 mEq  20-40 mEq Oral or Feeding Tube Q2H PRN         potassium chloride 10 mEq in 100 mL intermittent infusion with 10 mg lidocaine  10 mEq Intravenous Q1H PRN         potassium chloride 10 mEq in 100 mL sterile water intermittent infusion (premix)  10 mEq Intravenous Q1H PRN         potassium chloride 20 mEq in 50 mL intermittent infusion  20 mEq Intravenous Q2H PRN         potassium chloride SA (K-DUR/KLOR-CON M) CR tablet 20-40 mEq  20-40 mEq Oral Q2H PRN         senna-docusate (SENOKOT-S;PERICOLACE) 8.6-50 MG per tablet 1 tablet  1 tablet Oral BID PRN        Or     senna-docusate (SENOKOT-S;PERICOLACE) 8.6-50 MG per tablet 2 tablet  2 tablet Oral BID PRN         sodium chloride 0.9% infusion   Intravenous Continuous 50 mL/hr at 11/19/18 1358       thiamine tablet 100 mg  100 mg Oral Daily   100 mg at 11/19/18 1052     No current [facility]-ordered outpatient prescriptions on file.         Allergies      No Known Allergies     Medical Review of Systems     /76 (BP Location: Left arm)  Pulse 75  Temp 97.6  F (36.4  C) (Oral)  Resp 18  Ht 1.676 m (5' 6\")  Wt 67.2 kg (148 lb 2.4 oz)  LMP  (LMP Unknown)  SpO2 97%  BMI 23.91 kg/m2  Body mass index is 23.91 kg/(m^2).  A 10-point review of systems was performed by Branden Phoenix MD and is negative, no new findings.      Psychiatric Examination     Appearance Lying in bed, dressed in gown. Appears stated age.   Attitude " Cooperative   Orientation Oriented to person, place, time   Eye Contact Good   Speech Regular rate, rhythm, volume and tone   Language Normal   Psychomotor Behavior Normal   Mood Tearful, but pleasant    Affect Pleasant    Thought Process Goal Oriented, Intact   Associations Intact   Thought Content Patient is currently negative for suicidal ideation, negative for plan or intent, able to contract no self harm and identify barriers to suicide.  Negative for obsessions, compulsions or psychosis.     Fund of Knowledge Intact   Insight Intact   Judgement Intact   Attention Span & Concentration Intact   Recent & Remote Memory Poor   Gait Not tested   Muscle Tone Intact on visual inspection       Labs     Labs reviewed.  Recent Results (from the past 24 hour(s))   Glucose by meter    Collection Time: 11/19/18  7:58 AM   Result Value Ref Range    Glucose 84 70 - 99 mg/dL   Glucose by meter    Collection Time: 11/19/18 11:58 AM   Result Value Ref Range    Glucose 149 (H) 70 - 99 mg/dL        Impression   The patient is a 38-year-old woman presenting with acute alcohol intoxication and a probable overdose which may include Celexa and Xanax. Due to patient endorsing high anxiety symptoms, Dr. Phoenix informed patient about the medication Trintellix and the side effects and benefits of this medication. Patient has agreed to try this medication. Thus, Dr. Phoenix started patient on Trintellix 5mg daily. Additionally, to help subside patients anxiety throughout the day, Dr. Phoenix will start patient on Seroquel 12.5-25mg BID PRN. Patient should follow up with Dr. Phoenix at [facility].      Diagnoses     1.  Delirium secondary to polydrug ingestion, alcohol intoxication.   2.  Alcohol use disorder by history.   3.  Major depressive disorder, recurrent, severe by history with postpartum onset     Plan     1. Explained Side effects, benefits and complications of medications to the patient.   2. Medication  changes: Start Trintellix 5mg and Seroquel 12.5 - 25mg 2 times PRN   3. Discussed treatment plan with patient and team.  4. Re-consult Psychiatry if needed.  5. Patient should follow-up with Dr. Phoenix at [facility] ([phone]) on December 3rd at 2:45pm      TIME SPENT IN PSYCHIATRY CONSULT: 15 MINUTES.    Attestation:   Patient has been seen and evaluated by me, Branden Phoenix MD.    Patient ID:  Name: Ana Blunt    MRN: [id number]  Admission: 11/18/2018   YOB: 1980

## 2018-11-20 NOTE — PLAN OF CARE
Problem: Patient Care Overview  Goal: Plan of Care/Patient Progress Review  Outcome: No Change  A+Ox4, up with SBA in room and halls. Tolerating regular diet, good appetite. Q4 Neuros intact. Carmichael out this AM, still voiding appropriately. LS clear, denies SOB. BS+ denies n/v. IVF @ 50. Tele: NSR. VSS, on RA, denies pain. Possible discharge tomorrow 11/20. Nursing will continue to monitor.

## 2018-11-20 NOTE — DISCHARGE SUMMARY
"[facility]  Discharge Summary  Hospitalist    Date of Admission:  11/18/2018  Date of Discharge:  11/20/2018 11:48 AM  Provider:  Shoaib Montano DO [facility]    Discharge Diagnoses   1.  Alcohol intoxication  2.  Polydrug ingestion of celexa and xanax    Other medical issues:  Past Medical History:   Diagnosis Date     Anxiety      Chronic neck pain      Iron deficiency anemia      Major depression        History of Present Illness   Ana Blunt is an 38 year old female who presented with an overdose.  Please see the admission history and physical for full details.    Hospital Course   Ana Blunt was admitted on 11/18/2018.  The following problems were addressed during her hospitalization:    Ms. Blunt presented with altered mentation following alcohol, celexa, and xanax use.   She was treated supportively with close monitoring and considerably improved over 24 hours.  She was taking oral intake well and up walking around well on the day of discharge.  Psychiatry saw her and started Trintellix and seroquel PRN.  They felt she could discharge and follow-up with Dr. Phoenix outpatient on 12/3.  Please see the detailed medical record for further details of her stay.        Significant Results and Procedures   See below    Pending Results     Unresulted Labs Ordered in the Past 30 Days of this Admission     No orders found from 9/19/2018 to 11/19/2018.          Code Status   Full Code       Primary Care Physician   Physician No Ref-Primary    Blood pressure 121/86, pulse 76, temperature 97.6  F (36.4  C), temperature source Oral, resp. rate 18, height 1.676 m (5' 6\"), weight 67.2 kg (148 lb 2.4 oz), SpO2 94 %.    Alert, oriented, heart regular, lungs clear.    Discharge Disposition   Discharged to home    Consultations This Hospital Stay   PSYCHIATRY IP CONSULT  PSYCHIATRY IP CONSULT    Time Spent on this Encounter   I, Shoaib Montano, personally saw the patient today and spent greater than 30 " minutes discharging this patient.    Discharge Orders     Follow-up and recommended labs and tests    1. Patient should follow-up with Dr. Phoenix at [facility] ([phone]) on December 3rd at 2:45pm           Reason for your hospital stay   Alcohol intoxication, overdose, anxiety     Activity   Your activity upon discharge: activity as tolerated     Diet   Follow this diet upon discharge: Regular, avoid alcohol       Discharge Medications   Current Discharge Medication List      START taking these medications    Details   QUEtiapine (SEROQUEL) 25 MG tablet Take 0.5-1 tablets (12.5-25 mg) by mouth 2 times daily as needed (anxiety)  Qty: 30 tablet, Refills: 0    Associated Diagnoses: Anxiety      vortioxetine (TRINTELLIX/BRINTELLIX) 5 MG tablet Take 1 tablet (5 mg) by mouth daily  Qty: 30 tablet, Refills: 0    Associated Diagnoses: Anxiety         CONTINUE these medications which have NOT CHANGED    Details   ferrous sulfate (IRON) 325 (65 Fe) MG tablet Take 325 mg by mouth daily      magnesium oxide 400 MG CAPS Take 1 capsule by mouth daily      multivitamin, therapeutic (THERA-VIT) TABS tablet Take 1 tablet by mouth daily         STOP taking these medications       ALPRAZolam (XANAX) 0.5 MG tablet Comments:   Reason for Stopping:         citalopram (CELEXA) 20 MG tablet Comments:   Reason for Stopping:               Allergies   No Known Allergies  Data      Ref. Range 11/20/2018 08:34   Sodium Latest Ref Range: 133 - 144 mmol/L 140   Potassium Latest Ref Range: 3.4 - 5.3 mmol/L 3.7   Chloride Latest Ref Range: 94 - 109 mmol/L 109   Carbon Dioxide Latest Ref Range: 20 - 32 mmol/L 23   Urea Nitrogen Latest Ref Range: 7 - 30 mg/dL 5 (L)   Creatinine Latest Ref Range: 0.52 - 1.04 mg/dL 0.51 (L)   GFR Estimate Latest Ref Range: >60 mL/min/1.7m2 >90   GFR Estimate If Black Latest Ref Range: >60 mL/min/1.7m2 >90   Calcium Latest Ref Range: 8.5 - 10.1 mg/dL 8.9   Anion Gap Latest Ref Range: 3 - 14 mmol/L 8    HCG negative     Ref. Range 11/18/2018 18:34   Amphetamine Qual Urine Latest Ref Range: NEG^Negative  Negative   Cocaine Qual Urine Latest Ref Range: NEG^Negative  Negative   Opiates Qualitative Urine Latest Ref Range: NEG^Negative  Negative   Cannabinoids Qual Urine Latest Ref Range: NEG^Negative  Negative   Barbiturates Qual Urine Latest Ref Range: NEG^Negative  Negative   Pcp Qual Urine Latest Ref Range: NEG^Negative  Negative   Benzodiazepine Qual Urine Latest Ref Range: NEG^Negative  Positive (A)     Results for orders placed or performed during the hospital encounter of 11/18/18   CT Head w/o Contrast    Narrative    CT OF THE HEAD WITHOUT CONTRAST 11/18/2018 7:31 PM     COMPARISON: None.    HISTORY: Altered mental status.     TECHNIQUE: Axial CT images of the head from the skull base to the  vertex were acquired without IV contrast.    FINDINGS: The ventricles and basal cisterns are within normal limits  in configuration. There is no midline shift. There are no extra-axial  fluid collections.  Gray-white differentiation is well maintained.    No intracranial hemorrhage, mass or recent infarct.    The visualized paranasal sinuses are well-aerated. There is no  mastoiditis. There are no fractures of the visualized bones.      Impression    IMPRESSION:  Normal head CT.      Radiation dose for this scan was reduced using automated exposure  control, adjustment of the mA and/or kV according to patient size, or  iterative reconstruction technique    ALEXANDRIA PEÑALOZA MD

## 2018-11-20 NOTE — PLAN OF CARE
Problem: Patient Care Overview  Goal: Discharge Needs Assessment  Outcome: Adequate for Discharge Date Met: 11/20/18  A&Ox4, calm/cooperative. VSS on RA. Tele NSR. VPM discontinued. Neuros intact. Up independently in room. Regular diet, good appetite. IV removed. Seen by Psych. Discharge instructions reviewed and questions answered.     Discharge    Patient discharged to home, transportation provided by significant other    Listed belongings gathered and returned to patient. Yes  Care Plan and Patient education resolved: Yes  Prescriptions if needed, hard copies sent with patient  Yes  Home and hospital acquired medications returned to patient: NA  Medication Bin checked and emptied on discharge Yes  Follow up appointment made for patient: Yes

## 2020-08-03 ENCOUNTER — HOSPITAL ENCOUNTER (EMERGENCY)
Facility: CLINIC | Age: 40
Discharge: HOME | End: 2020-08-03
Attending: EMERGENCY MEDICINE | Admitting: EMERGENCY MEDICINE
Payer: COMMERCIAL

## 2020-08-03 VITALS
SYSTOLIC BLOOD PRESSURE: 104 MMHG | HEART RATE: 99 BPM | RESPIRATION RATE: 16 BRPM | DIASTOLIC BLOOD PRESSURE: 74 MMHG | TEMPERATURE: 97.1 F | OXYGEN SATURATION: 96 %

## 2020-08-03 DIAGNOSIS — F10.920 ALCOHOLIC INTOXICATION WITHOUT COMPLICATION (H): ICD-10-CM

## 2020-08-03 PROCEDURE — 25000128 H RX IP 250 OP 636: Performed by: EMERGENCY MEDICINE

## 2020-08-03 PROCEDURE — 99283 EMERGENCY DEPT VISIT LOW MDM: CPT

## 2020-08-03 RX ORDER — ONDANSETRON 4 MG/1
4 TABLET, ORALLY DISINTEGRATING ORAL ONCE
Status: COMPLETED | OUTPATIENT
Start: 2020-08-03 | End: 2020-08-03

## 2020-08-03 RX ADMIN — ONDANSETRON 4 MG: 4 TABLET, ORALLY DISINTEGRATING ORAL at 05:44

## 2020-08-03 RX ADMIN — ONDANSETRON 4 MG: 4 TABLET, ORALLY DISINTEGRATING ORAL at 01:09

## 2020-08-03 ASSESSMENT — ENCOUNTER SYMPTOMS: NAUSEA: 1

## 2020-08-03 NOTE — ED NOTES
Pt now answering questions with MD at bedside. Keeping face hidden with eyes closed.Was drinking tonight. Denies drug use. Doesn't remember events leading up to coming to the hospital. Feeling nauseated.
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
## 2020-08-03 NOTE — ED NOTES
Bed: ED17  Expected date: 8/3/20  Expected time: 12:35 AM  Means of arrival: Ambulance  Comments:  HEMS 427 40F alcohol indulgence

## 2020-08-03 NOTE — ED AVS SNAPSHOT
Emergency Department  [address] [place] [id number]  Phone:  [phone]  Fax:  [phone]                                    Ana Blunt   MRN: [id number]    Department:   Emergency Department   Date of Visit:  8/3/2020           After Visit Summary Signature Page    I have received my discharge instructions, and my questions have been answered. I have discussed any challenges I see with this plan with the nurse or doctor.    ..........................................................................................................................................  Patient/Patient Representative Signature      ..........................................................................................................................................  Patient Representative Print Name and Relationship to Patient    ..................................................               ................................................  Date                                   Time    ..........................................................................................................................................  Reviewed by Signature/Title    ...................................................              ..............................................  Date                                               Time          22EPIC Rev 08/18
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
## 2020-08-04 ASSESSMENT — ENCOUNTER SYMPTOMS
SHORTNESS OF BREATH: 0
FEVER: 0
ABDOMINAL PAIN: 0

## 2020-12-03 DIAGNOSIS — Z20.822 ENCOUNTER FOR LABORATORY TESTING FOR COVID-19 VIRUS: Primary | ICD-10-CM
